# Patient Record
Sex: MALE | Race: WHITE | NOT HISPANIC OR LATINO
[De-identification: names, ages, dates, MRNs, and addresses within clinical notes are randomized per-mention and may not be internally consistent; named-entity substitution may affect disease eponyms.]

---

## 2017-05-08 PROBLEM — Z00.00 ENCOUNTER FOR PREVENTIVE HEALTH EXAMINATION: Status: ACTIVE | Noted: 2017-05-08

## 2017-05-12 ENCOUNTER — APPOINTMENT (OUTPATIENT)
Dept: INTERNAL MEDICINE | Facility: CLINIC | Age: 27
End: 2017-05-12

## 2017-05-19 ENCOUNTER — APPOINTMENT (OUTPATIENT)
Dept: INTERNAL MEDICINE | Facility: CLINIC | Age: 27
End: 2017-05-19

## 2017-05-19 ENCOUNTER — LABORATORY RESULT (OUTPATIENT)
Age: 27
End: 2017-05-19

## 2017-05-19 VITALS
WEIGHT: 202 LBS | BODY MASS INDEX: 28.28 KG/M2 | HEART RATE: 57 BPM | TEMPERATURE: 98.3 F | DIASTOLIC BLOOD PRESSURE: 70 MMHG | SYSTOLIC BLOOD PRESSURE: 108 MMHG | HEIGHT: 71 IN | RESPIRATION RATE: 14 BRPM | OXYGEN SATURATION: 98 %

## 2017-05-19 DIAGNOSIS — F17.200 NICOTINE DEPENDENCE, UNSPECIFIED, UNCOMPLICATED: ICD-10-CM

## 2017-05-19 RX ORDER — NALTREXONE 380 MG
380 KIT INTRAMUSCULAR
Qty: 1 | Refills: 5 | Status: ACTIVE | COMMUNITY

## 2017-05-24 LAB — SUBOXONE: NEGATIVE

## 2017-05-26 LAB
AMPHET UR-MCNC: NEGATIVE
BARBITURATES UR-MCNC: NEGATIVE
BENZODIAZ UR-MCNC: NEGATIVE
COCAINE METAB.OTHER UR-MCNC: NEGATIVE
CREATININE, URINE: 119.2 MG/DL
METHADONE UR-MCNC: NEGATIVE
METHAQUALONE UR-MCNC: NEGATIVE
OPIATES UR-MCNC: NEGATIVE
PCP UR-MCNC: NEGATIVE
PROPOXYPH UR QL: NEGATIVE
THC UR QL: NORMAL

## 2017-06-16 ENCOUNTER — APPOINTMENT (OUTPATIENT)
Dept: INTERNAL MEDICINE | Facility: CLINIC | Age: 27
End: 2017-06-16

## 2017-06-16 VITALS
BODY MASS INDEX: 28.28 KG/M2 | OXYGEN SATURATION: 98 % | DIASTOLIC BLOOD PRESSURE: 80 MMHG | RESPIRATION RATE: 14 BRPM | WEIGHT: 202 LBS | HEART RATE: 66 BPM | TEMPERATURE: 98 F | HEIGHT: 71 IN | SYSTOLIC BLOOD PRESSURE: 110 MMHG

## 2017-06-16 RX ORDER — NALTREXONE 380 MG
380 KIT INTRAMUSCULAR
Qty: 1 | Refills: 5 | Status: ACTIVE | COMMUNITY
Start: 2017-05-19 | End: 1900-01-01

## 2017-07-14 ENCOUNTER — APPOINTMENT (OUTPATIENT)
Dept: INTERNAL MEDICINE | Facility: CLINIC | Age: 27
End: 2017-07-14

## 2017-07-14 VITALS
WEIGHT: 200 LBS | HEIGHT: 71 IN | TEMPERATURE: 98 F | SYSTOLIC BLOOD PRESSURE: 124 MMHG | DIASTOLIC BLOOD PRESSURE: 80 MMHG | HEART RATE: 62 BPM | BODY MASS INDEX: 28 KG/M2 | OXYGEN SATURATION: 99 % | RESPIRATION RATE: 14 BRPM

## 2017-08-22 ENCOUNTER — APPOINTMENT (OUTPATIENT)
Dept: INTERNAL MEDICINE | Facility: CLINIC | Age: 27
End: 2017-08-22
Payer: COMMERCIAL

## 2017-08-22 VITALS
TEMPERATURE: 98.2 F | HEIGHT: 71 IN | RESPIRATION RATE: 14 BRPM | WEIGHT: 204 LBS | SYSTOLIC BLOOD PRESSURE: 118 MMHG | BODY MASS INDEX: 28.56 KG/M2 | OXYGEN SATURATION: 98 % | DIASTOLIC BLOOD PRESSURE: 82 MMHG | HEART RATE: 83 BPM

## 2017-08-22 PROCEDURE — 99214 OFFICE O/P EST MOD 30 MIN: CPT

## 2017-09-22 ENCOUNTER — APPOINTMENT (OUTPATIENT)
Dept: INTERNAL MEDICINE | Facility: CLINIC | Age: 27
End: 2017-09-22
Payer: COMMERCIAL

## 2017-09-22 VITALS
WEIGHT: 210 LBS | TEMPERATURE: 98.5 F | SYSTOLIC BLOOD PRESSURE: 120 MMHG | HEART RATE: 75 BPM | BODY MASS INDEX: 29.4 KG/M2 | RESPIRATION RATE: 14 BRPM | OXYGEN SATURATION: 98 % | DIASTOLIC BLOOD PRESSURE: 70 MMHG | HEIGHT: 71 IN

## 2017-09-22 DIAGNOSIS — F11.20 OPIOID DEPENDENCE, UNCOMPLICATED: ICD-10-CM

## 2017-09-22 PROCEDURE — 99214 OFFICE O/P EST MOD 30 MIN: CPT

## 2017-10-16 ENCOUNTER — APPOINTMENT (OUTPATIENT)
Dept: INTERNAL MEDICINE | Facility: CLINIC | Age: 27
End: 2017-10-16

## 2017-11-02 ENCOUNTER — APPOINTMENT (OUTPATIENT)
Dept: INTERNAL MEDICINE | Facility: CLINIC | Age: 27
End: 2017-11-02
Payer: MEDICAID

## 2017-11-02 VITALS
RESPIRATION RATE: 14 BRPM | OXYGEN SATURATION: 98 % | WEIGHT: 210 LBS | HEART RATE: 78 BPM | TEMPERATURE: 98 F | HEIGHT: 71 IN | SYSTOLIC BLOOD PRESSURE: 110 MMHG | DIASTOLIC BLOOD PRESSURE: 80 MMHG | BODY MASS INDEX: 29.4 KG/M2

## 2017-11-02 DIAGNOSIS — F11.20 OPIOID DEPENDENCE, UNCOMPLICATED: ICD-10-CM

## 2017-11-02 PROCEDURE — 99214 OFFICE O/P EST MOD 30 MIN: CPT

## 2022-03-27 ENCOUNTER — EMERGENCY (EMERGENCY)
Facility: HOSPITAL | Age: 32
LOS: 1 days | Discharge: HOME | End: 2022-03-27
Attending: EMERGENCY MEDICINE | Admitting: EMERGENCY MEDICINE
Payer: MEDICAID

## 2022-03-27 VITALS
WEIGHT: 179.9 LBS | TEMPERATURE: 98 F | OXYGEN SATURATION: 98 % | SYSTOLIC BLOOD PRESSURE: 130 MMHG | DIASTOLIC BLOOD PRESSURE: 80 MMHG | HEART RATE: 81 BPM | HEIGHT: 71 IN | RESPIRATION RATE: 18 BRPM

## 2022-03-27 VITALS
DIASTOLIC BLOOD PRESSURE: 68 MMHG | SYSTOLIC BLOOD PRESSURE: 122 MMHG | OXYGEN SATURATION: 98 % | RESPIRATION RATE: 18 BRPM | HEART RATE: 68 BPM

## 2022-03-27 PROCEDURE — 73000 X-RAY EXAM OF COLLAR BONE: CPT | Mod: 26,LT

## 2022-03-27 PROCEDURE — 73030 X-RAY EXAM OF SHOULDER: CPT | Mod: 26,LT

## 2022-03-27 PROCEDURE — 99283 EMERGENCY DEPT VISIT LOW MDM: CPT | Mod: 25

## 2022-03-27 PROCEDURE — 99284 EMERGENCY DEPT VISIT MOD MDM: CPT

## 2022-03-27 PROCEDURE — 73030 X-RAY EXAM OF SHOULDER: CPT

## 2022-03-27 PROCEDURE — 73000 X-RAY EXAM OF COLLAR BONE: CPT

## 2022-03-27 RX ORDER — IBUPROFEN 200 MG
600 TABLET ORAL ONCE
Refills: 0 | Status: COMPLETED | OUTPATIENT
Start: 2022-03-27 | End: 2022-03-27

## 2022-03-27 RX ADMIN — Medication 600 MILLIGRAM(S): at 15:40

## 2022-03-27 RX ADMIN — Medication 600 MILLIGRAM(S): at 15:05

## 2022-03-27 NOTE — ED PROVIDER NOTE - ATTENDING CONTRIBUTION TO CARE
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 on rom testing the clavicle reduced into the space near acromion reducing the skin tenting and pt felt a pop at the same time. .the pt remaned neuro vasc intact and able to fully adduct hand across chest to opposite shoulder  no wrist drop no other fx or deformity no chest wall pain no sob no neck pain   plan xray reveals ac separation with reduction during exam  neuro vasc intact  pt to have sling ibuprofen ice referral to ortho

## 2022-03-27 NOTE — ED PROVIDER NOTE - PATIENT PORTAL LINK FT
You can access the FollowMyHealth Patient Portal offered by [facility] by registering at the following website: [email or web address]. By joining [facility]’s FollowMyHealth portal, you will also be able to view your health information using other applications (apps) compatible with our system.

## 2022-03-27 NOTE — ED PROVIDER NOTE - CARE PROVIDER_API CALL
Camilo Cabrera)  Orthopaedic Surgery Surgery  [address] [place] [address]  Phone: [phone]  Fax: [phone]  Follow Up Time: 1-3 Days

## 2022-03-27 NOTE — ED PROVIDER NOTE - OBJECTIVE STATEMENT
30 y/o otherwise healthy male presents to ED fir c.o L shoulder pain. States he was playing lacrosse indoor when he fell onto L shoulder. Originally was able to keep playing but then pain became worse and hs been having difficulty ranging the shoulder. States he feels things clicking in his shoulder. Denies head injury neck or back pain. Denies numbness tingling weakness to L arm. pt is right hand dominate. Works in construction. Denies ay other inury

## 2022-03-27 NOTE — ED PROVIDER NOTE - NSFOLLOWUPINSTRUCTIONS_ED_ALL_ED_FT
1.  REST APPLY ICE AS NEEDED. FOR PAIN YOU CAN TAKE IBUPROFEN (MOTRIN, ADVIL) OR ACETAMINOPHEN (TYLENOL) AS NEEDED, AS DIRECTED ON PACKAGING.  2. FOLLOW UP WITH ___ORTHOPEDIST_______ AS DIRECTED.  YOU WERE GIVEN COPIES OF ALL LABS AND IMAGING RESULTS FROM YOUR ER VISIT--PLEASE TAKE THEM WITH YOU TO YOUR APPOINTMENT.  3. IF NEEDED, CALL [phone] TO FIND A PRIMARY CARE PHYSICIAN.  OR CALL [phone] TO MAKE AN APPOINTMENT WITH THE MEDICAL CLINIC.  4. RETURN TO THE ER FOR ANY WORSENING SYMPTOMS.      Acromioclavicular Separation       A shoulder separation (acromioclavicular separation) is an injury to the tissues that connect bones to each other (ligaments) between the top of your shoulder blade (acromion) and your collarbone (clavicle).    The ligaments may be stretched, partially torn, or completely torn.  •A stretched ligament may not cause much pain, and it does not move the collarbone out of place. A stretched ligament looks normal on an X-ray.      •A partial tear causes an injury that is a bit worse, and it may move the collarbone slightly out of place.      •A complete tear causes serious injury. The surrounding shoulder ligaments are completely torn. This moves the collarbone out of position and creates a bad shape (deformity) of the shoulder.        What are the causes?    Common causes of this condition include:  •Falling on the shoulder.      •Receiving a hard, direct hit (blow) to the top of the shoulder.      •Falling on an outstretched arm.        What increases the risk?    You may be at greater risk of a shoulder separation if you:  •Are male.      •Are younger than 35.      •Play a contact sport, such as football or hockey.        What are the signs or symptoms?    The most common symptom of a shoulder separation is pain on the top of the shoulder after falling on it or receiving a blow to it. Other signs and symptoms include:  •Shoulder deformity.      •Swelling of the shoulder.      •Decreased ability to move the shoulder.      •Bruising on top of the shoulder.        How is this diagnosed?    Your health care provider may suspect a shoulder separation based on your symptoms and the details of a recent injury you experienced. The condition will be diagnosed based on:•A physical exam. Your provider may:  •Press on your shoulder.      •Test the movement of your shoulder.      •Ask you to hold a weight in your hand to see if the separation increases.      •Imaging tests, such as:  •X-rays.      •MRI.          How is this treated?    Treatment for this condition depends on the cause and severity of the injury.•A shoulder separation caused by a stretched ligament may require 2–12 weeks of the following:  •Wearing a sling.      •Taking medicines to help relieve pain.      •Applying cold packs to your shoulder.      •Physical therapy. If needed, a physical therapist will teach you to do daily exercises to strengthen your shoulder muscles and prevent stiffness.        •Surgery may be needed for severe injuries that include breaks (fractures) in a bone, or injuries that do not get better with nonsurgical treatments. To help with healing, you will need to keep your joint in place for a period of time (immobilization) and do physical therapy.        Follow these instructions at home:    Medicines     •Take over-the-counter and prescription medicines only as told by your health care provider.      • Do not drive or use heavy machinery while taking prescription pain medicine.    •If you are taking prescription pain medicine, take actions to prevent or treat constipation. Your health care provider may recommend that you:   •Drink enough fluid to keep your urine pale yellow.       •Eat foods that are high in fiber, such as fresh fruits and vegetables, whole grains, and beans.       •Limit foods that are high in fat and processed sugars, such as fried or sweet foods.       •Take an over-the-counter or prescription medicine for constipation.        If you have a sling:     •Wear your sling as told by your health care provider. Remove it only as told by your health care provider.      •Loosen the sling if your fingers tingle, become numb, or turn cold and blue.      •Keep the sling clean.    •If the sling is not waterproof:  •Do not let it get wet.      •Cover it with a watertight covering when you take a bath or a shower.          Managing pain, stiffness, and swelling    •If directed, apply ice to the top of your shoulder:  •Put ice in a plastic bag.      •Place a towel between your skin and the bag.      •Leave the ice on for 20 minutes, 2–3 times a day.        • Do not do any activities that make your pain worse.      Activity     • Do not lift anything that is heavier than 10 lb (4.5 kg), or the limit that you were told, until your health care provider says that it is safe.      •Rest your shoulder. Avoid activities that take a lot of effort (strenuous activities) for as long as told by your health care provider.      •Return to your normal activities as told by your health care provider. Ask your health care provider what activities are safe for you.      •Do range of motion exercises as told by your health care provider.      General instructions     • Do not use any products that contain nicotine or tobacco, such as cigarettes and e-cigarettes. These can delay healing. If you need help quitting, ask your health care provider.      •Keep all follow-up visits as told by your health care provider. This is important. These include visits for physical therapy as directed by your health care provider.        Contact a health care provider if:    •Your pain medicine is not relieving your pain.      •Your pain and stiffness are not improving after 2 weeks.      •You are unable to do your physical therapy exercises because of pain or stiffness.        Get help right away if:    •Your arm on the injured side feels cold or numb.      •Your skin or fingers on the arm on the injured side turn blue or gray.        Summary    •A shoulder separation (acromioclavicular separation) is an injury to the tissues that connect bones to each other (ligaments) between the top of your shoulder blade (acromion) and your collarbone (clavicle).      •The ligaments may be stretched, partially torn, or completely torn.      •The most common cause of a shoulder separation is falling on or receiving a blow to the top of the shoulder. Falling with an outstretched arm may also cause this injury.      •Rest your shoulder. Avoid activities that take a lot of effort (strenuous activities) for as long as told by your health care provider.      This information is not intended to replace advice given to you by your health care provider. Make sure you discuss any questions you have with your health care provider.    Fracture    A fracture is a break in one of your bones. This can occur from a variety of injuries, especially traumatic ones. Symptoms include pain, bruising, or swelling. Do not use the injured limb. If a fracture is in one of the bones below your waist, do not put weight on that limb unless instructed to do so by your healthcare provider. Crutches or a cane may have been provided. A splint or cast may have been applied by your health care provider. Make sure to keep it dry and follow up with an orthopedist as instructed.    SEEK IMMEDIATE MEDICAL CARE IF YOU HAVE ANY OF THE FOLLOWING SYMPTOMS: numbness, tingling, increasing pain, or weakness in any part of the injured limb.

## 2022-03-27 NOTE — ED PROVIDER NOTE - PHYSICAL EXAMINATION
PE:   GEN: Awake, alert, interactive, NAD, non-toxic appearing.   HEAD: Atraumatic  CARDIAC: Reg rate and rhythm, S1,S2, no murmur/rub/gallop. Strong central and peripheral pulses, Brisk cap refill, no evident pedal edema. 2+ radial pulses bilat   RESP: No distress noted. L/S clear = Bilat without accessory muscle use, wheeze, rhonchi, rales.   NEURO: AOx3, CN II-XII grossly intact without focal deficit.   MSK: deformity noted to L distal clavicle with swelling, full ROM to L shoulder with guarding, neuro vasc intact,  strength 5/5, and equal bilat    SKIN: Warm, dry, normal color, without apparent rashes.

## 2022-03-27 NOTE — ED PROVIDER NOTE - CARE PLAN
Principal Discharge DX:	Closed nondisplaced fracture of left clavicle, initial encounter  Secondary Diagnosis:	Acromioclavicular joint separation, left, initial encounter   1

## 2022-03-27 NOTE — ED PROVIDER NOTE - NS ED ATTENDING STATEMENT MOD
This was a shared visit with the [facility]. I reviewed and verified the documentation and independently performed the documented:

## 2022-03-27 NOTE — ED PROVIDER NOTE - WR ORDER STATUS 2
aMtt Elias   MRN: [id number]    Department:  [facility] Emergency Department   Date of Visit:  5/4/2018           Disclosure     Insurance plans vary and the physician(s) referred by the ER may not be covered by your plan.  Please contact your i tell this physician (or your personal doctor if your instructions are to return to your personal doctor) about any new or lasting problems. The primary care or specialist physician will see patients referred from the [facility] Emergency Department.  Jayde Case Performed

## 2022-03-27 NOTE — ED ADULT TRIAGE NOTE - HEART RATE (BEATS/MIN)
81 Quality 138: Melanoma: Coordination Of Care: A treatment plan was communicated to the physicians providing continuing care within one month of diagnosis outlining: diagnosis, tumor thickness and a plan for surgery or alternate care. Detail Level: Detailed Quality 137: Melanoma: Continuity Of Care - Recall System: Patient information entered into a recall system that includes: target date for the next exam specified AND a process to follow up with patients regarding missed or unscheduled appointments

## 2022-03-27 NOTE — ED ADULT NURSE NOTE - OBJECTIVE STATEMENT
Received pt c/o L shoulder pain with limited medial ROM of L shoudler.  Pt states he was playing football with friends when he was "checked" in the shoulder.   Pt denies head injury/loc, denies neck pain/midline back tenderness.  Respirations even and unlabored pt in no acute distress. BN
